# Patient Record
Sex: MALE | Race: WHITE | Employment: FULL TIME | URBAN - METROPOLITAN AREA
[De-identification: names, ages, dates, MRNs, and addresses within clinical notes are randomized per-mention and may not be internally consistent; named-entity substitution may affect disease eponyms.]

---

## 2018-08-22 ENCOUNTER — APPOINTMENT (OUTPATIENT)
Dept: GENERAL RADIOLOGY | Age: 38
End: 2018-08-22
Payer: COMMERCIAL

## 2018-08-22 ENCOUNTER — HOSPITAL ENCOUNTER (EMERGENCY)
Age: 38
Discharge: HOME | End: 2018-08-22
Attending: EMERGENCY MEDICINE
Payer: COMMERCIAL

## 2018-08-22 ENCOUNTER — OFFICE VISIT (OUTPATIENT)
Dept: ORTHOPEDIC SURGERY | Age: 38
End: 2018-08-22

## 2018-08-22 VITALS — BODY MASS INDEX: 25.74 KG/M2 | WEIGHT: 190.04 LBS | HEIGHT: 72 IN

## 2018-08-22 VITALS
RESPIRATION RATE: 20 BRPM | WEIGHT: 190 LBS | HEART RATE: 72 BPM | TEMPERATURE: 98 F | HEIGHT: 72 IN | DIASTOLIC BLOOD PRESSURE: 108 MMHG | SYSTOLIC BLOOD PRESSURE: 174 MMHG | OXYGEN SATURATION: 99 % | BODY MASS INDEX: 25.73 KG/M2

## 2018-08-22 DIAGNOSIS — S61.411A LACERATION OF RIGHT HAND WITHOUT FOREIGN BODY, INITIAL ENCOUNTER: Primary | ICD-10-CM

## 2018-08-22 DIAGNOSIS — S61.111A LACERATION OF RIGHT THUMB WITH DAMAGE TO NAIL, FOREIGN BODY PRESENCE UNSPECIFIED, INITIAL ENCOUNTER: Primary | ICD-10-CM

## 2018-08-22 PROCEDURE — 90715 TDAP VACCINE 7 YRS/> IM: CPT | Performed by: EMERGENCY MEDICINE

## 2018-08-22 PROCEDURE — 99203 OFFICE O/P NEW LOW 30 MIN: CPT | Performed by: ORTHOPAEDIC SURGERY

## 2018-08-22 PROCEDURE — 6370000000 HC RX 637 (ALT 250 FOR IP): Performed by: EMERGENCY MEDICINE

## 2018-08-22 PROCEDURE — 73140 X-RAY EXAM OF FINGER(S): CPT

## 2018-08-22 PROCEDURE — 99283 EMERGENCY DEPT VISIT LOW MDM: CPT

## 2018-08-22 PROCEDURE — 4500000023 HC ED LEVEL 3 PROCEDURE

## 2018-08-22 PROCEDURE — 90471 IMMUNIZATION ADMIN: CPT | Performed by: EMERGENCY MEDICINE

## 2018-08-22 PROCEDURE — 6360000002 HC RX W HCPCS: Performed by: EMERGENCY MEDICINE

## 2018-08-22 RX ORDER — OXYCODONE HYDROCHLORIDE AND ACETAMINOPHEN 5; 325 MG/1; MG/1
1 TABLET ORAL EVERY 6 HOURS PRN
Qty: 28 TABLET | Refills: 0 | Status: SHIPPED | OUTPATIENT
Start: 2018-08-22 | End: 2018-08-29

## 2018-08-22 RX ORDER — SULFAMETHOXAZOLE AND TRIMETHOPRIM 800; 160 MG/1; MG/1
1 TABLET ORAL 2 TIMES DAILY
Qty: 20 TABLET | Refills: 0 | Status: SHIPPED | OUTPATIENT
Start: 2018-08-22 | End: 2018-09-01

## 2018-08-22 RX ORDER — OXYCODONE HYDROCHLORIDE AND ACETAMINOPHEN 5; 325 MG/1; MG/1
1 TABLET ORAL ONCE
Status: COMPLETED | OUTPATIENT
Start: 2018-08-22 | End: 2018-08-22

## 2018-08-22 RX ADMIN — TETANUS TOXOID, REDUCED DIPHTHERIA TOXOID AND ACELLULAR PERTUSSIS VACCINE, ADSORBED 0.5 ML: 5; 2.5; 8; 8; 2.5 SUSPENSION INTRAMUSCULAR at 10:06

## 2018-08-22 RX ADMIN — OXYCODONE HYDROCHLORIDE AND ACETAMINOPHEN 1 TABLET: 5; 325 TABLET ORAL at 10:06

## 2018-08-22 ASSESSMENT — PAIN DESCRIPTION - ORIENTATION: ORIENTATION: RIGHT

## 2018-08-22 ASSESSMENT — ENCOUNTER SYMPTOMS
SHORTNESS OF BREATH: 0
TROUBLE SWALLOWING: 0
VOICE CHANGE: 0
WHEEZING: 0

## 2018-08-22 ASSESSMENT — PAIN DESCRIPTION - PAIN TYPE: TYPE: ACUTE PAIN

## 2018-08-22 ASSESSMENT — PAIN DESCRIPTION - PROGRESSION: CLINICAL_PROGRESSION: NOT CHANGED

## 2018-08-22 ASSESSMENT — PAIN SCALES - GENERAL: PAINLEVEL_OUTOF10: 5

## 2018-08-22 ASSESSMENT — PAIN DESCRIPTION - ONSET: ONSET: SUDDEN

## 2018-08-22 ASSESSMENT — PAIN DESCRIPTION - FREQUENCY: FREQUENCY: CONTINUOUS

## 2018-08-22 ASSESSMENT — PAIN DESCRIPTION - LOCATION: LOCATION: FINGER (COMMENT WHICH ONE)

## 2018-08-22 ASSESSMENT — PAIN DESCRIPTION - DESCRIPTORS: DESCRIPTORS: TINGLING

## 2018-08-22 NOTE — ED PROVIDER NOTES
[facility]  eMERGENCY dEPARTMENT eNCOUnter      Pt Name: Vineet Pacheco  MRN: [id number]  Didiergffrieda 1980  Date of evaluation: 8/22/2018  Provider: Rios Aaron MD    [address] [place] [address]       Chief Complaint   Patient presents with    Hand Injury     Laceration base of right thumb, difficulty moving thumb. Happened about 90 minutes ago while working with sheet metal.         HISTORY OF PRESENT ILLNESS   (Location/Symptom, Timing/Onset, Context/Setting, Quality, Duration, Modifying Factors, Severity)  Note limiting factors. The history is provided by the patient. Laceration   Location:  Hand  Hand laceration location:  R hand  Length:  2 cm  Depth: Through dermis  Quality: avulsion    Bleeding: controlled    Time since incident:  90 minutes  Injury mechanism: was working with sheet metal and cut his hand on edge. Pain details:     Quality:  Sharp    Severity:  Moderate    Timing:  Constant    Progression:  Worsening  Foreign body present: patient states he does not feel any foreign bodies got in cut. Relieved by:  None tried  Worsened by: Movement  Tetanus status:  Unknown  Associated symptoms: no fever, no numbness, no redness and no swelling    Associated symptoms comment:  Pain with movement of thumb      Nursing Notes were reviewed. REVIEW OF SYSTEMS    (2-9 systems for level 4, 10 or more for level 5)     Review of Systems   Constitutional: Negative for fever. HENT: Negative for drooling, trouble swallowing and voice change. Eyes: Negative for visual disturbance. Respiratory: Negative for shortness of breath and wheezing. Cardiovascular: Negative for chest pain and palpitations. Skin: Positive for wound. Neurological: Negative for seizures and syncope. Psychiatric/Behavioral: Negative for self-injury and suicidal ideas. Except as noted above the remainder of the review of systems was reviewed and negative.        PAST MEDICAL HISTORY     Past Medical History:   Diagnosis Date    Hypertension          SURGICAL HISTORY       Past Surgical History:   Procedure Laterality Date    KNEE ARTHROSCOPY           CURRENT MEDICATIONS       Previous Medications    METOPROLOL (TOPROL-XL) 50 MG XL TABLET    Take 50 mg by mouth 2 times daily. ALLERGIES     Patient has no known allergies. FAMILY HISTORY     History reviewed. No pertinent family history. SOCIAL HISTORY       Social History     Social History    Marital status: Single     Spouse name: N/A    Number of children: N/A    Years of education: N/A     Social History Main Topics    Smoking status: Current Every Day Smoker     Packs/day: 0.50     Types: Cigarettes    Smokeless tobacco: Current User     Types: Chew    Alcohol use No    Drug use: No    Sexual activity: Yes     Partners: Female     Other Topics Concern    None     Social History Narrative    None         PHYSICAL EXAM    (up to 7 for level 4, 8 or more for level 5)     ED Triage Vitals [08/22/18 0943]   BP Temp Temp Source Pulse Resp SpO2 Height Weight   (!) 174/108 98 °F (36.7 °C) Oral 72 20 99 % 6' (1.829 m) 190 lb (86.2 kg)       Physical Exam   Constitutional: He appears well-developed and well-nourished. No distress. HENT:   Head: Normocephalic and atraumatic. Eyes: Conjunctivae and EOM are normal.   Neck: No JVD present. No tracheal deviation present. Cardiovascular: Normal rate and intact distal pulses. 2+ DP and PT pulses equal bilaterally. Capillary refill intact in right thumb. Pulmonary/Chest: Effort normal. No respiratory distress. Musculoskeletal: He exhibits tenderness (2 cm mildly avulsed laceration to volar base of right thumb. No visiable bone or tendon inury on exam under local anesthesia. Direct tenderness over laceration. ). Limited flexion of right thumb at the first MCP joint. Neurological: He is alert.    Sensation intact in radial, median, ulnar nerve distribution of the right upper extremity including the distal right thumb. Flexion strength of right first digit at first MCP joint is limited. Skin: Skin is warm and dry. Nursing note and vitals reviewed. DIAGNOSTIC RESULTS     RADIOLOGY:     Interpretation per the Radiologist below, if available at the time of this note:    XR FINGER RIGHT (MIN 2 VIEWS)   Final Result   Soft tissue injury without underlying osseous abnormality             EMERGENCY DEPARTMENT COURSE and DIFFERENTIAL DIAGNOSIS/MDM:   Vitals:    Vitals:    08/22/18 0943   BP: (!) 174/108   Pulse: 72   Resp: 20   Temp: 98 °F (36.7 °C)   TempSrc: Oral   SpO2: 99%   Weight: 190 lb (86.2 kg)   Height: 6' (1.829 m)         MDM  Plain films negative for foreign body or acute bony injury. The patient is vascularly intact however I do have some concerns for tendon injury given his weakened strength on flexion of the thumb even after pain medication given. The patient reports he has a severe phobia of needles and therefore certain parts of his visit including tetanus vaccination, application of local anesthesia, and laceration repair with simple interrupted sutures are somewhat difficult to perform and multiple small breaks are taken to accommodate patient however actually all reasonable efforts are made to treat patient's pain, console and encourage patient, and perform the best exam and suture repair possible under the conditions. The wound is repaired as in procedure note below. Patient still has weakness with flexion of his thumb and therefore I still have a concern for possible tendon injury. He is placed in a splint for protection and I have placed a consult to orthopedic surgery regarding my concern for tendon injury. Dr. Alessandro Moore is available to see the patient at 1 PM today which I feel is appropriate follow-up. The importance of specialty outpatient follow-up is stressed the patient states understanding and agreement with this plan.     CONSULTS:  Amy Quintero

## 2018-08-22 NOTE — PROGRESS NOTES
Chief Complaint  Hand Injury ([facility] Right hand inj thumb laceration DOI 8/22/2018)      History of Present Illness:  Jaswinder Smith is a 34 y.o. male right-hand-dominant male, overall healthy and active, Tania Stai, who presents for evaluation of right thumb pain and weakness which began earlier today. Patient was accidentally struck by a sharp piece of sheet metal on the dorsal radial aspect of the right thumb, he had immediate pain, bleeding and weakness. He was seen in the local emergency room today and found to have inability to extend the right thumb. Wound was irrigated and closed. Patient's pain is moderate and throbbing. He feels weakness. Some numbness dorsally but not volarly of the right thumb    Medical History  Past Medical History:   Diagnosis Date    Hypertension      Past Surgical History:   Procedure Laterality Date    KNEE ARTHROSCOPY       No family history on file. Social History     Social History    Marital status: Single     Spouse name: N/A    Number of children: N/A    Years of education: N/A     Social History Main Topics    Smoking status: Current Every Day Smoker     Packs/day: 0.50     Types: Cigarettes    Smokeless tobacco: Current User     Types: Chew    Alcohol use No    Drug use: No    Sexual activity: Yes     Partners: Female     Other Topics Concern    None     Social History Narrative    None     Current Outpatient Prescriptions   Medication Sig Dispense Refill    sulfamethoxazole-trimethoprim (BACTRIM DS) 800-160 MG per tablet Take 1 tablet by mouth 2 times daily for 10 days 20 tablet 0    oxyCODONE-acetaminophen (PERCOCET) 5-325 MG per tablet Take 1 tablet by mouth every 6 hours as needed for Pain for up to 7 days. . 28 tablet 0    metoprolol (TOPROL-XL) 50 MG XL tablet Take 50 mg by mouth 2 times daily. No current facility-administered medications for this visit.       No Known Allergies    Review of Systems  Relevant review of systems reviewed and Exam: right thumb lac   Acuity: Acute   Type of Exam: Initial   Mechanism of Injury: lac from sheet metal       FINDINGS:   There is soft tissue irregularity adjacent to the 1st MCP joint consistent   with a laceration.  There is no evidence of radiopaque foreign body.  The   osseous structures are intact.           Impression   Soft tissue injury without underlying osseous abnormality                Assessment:  Right thumb laceration, extensor tendon injury    Impression:   Encounter Diagnosis   Name Primary?  Laceration of right thumb with damage to nail, foreign body presence unspecified, initial encounter Yes       Office Procedures:  No orders of the defined types were placed in this encounter. Treatment Plan:  Recommendation made to proceed to the operating room for a formal irrigation and exploration of the right thumb with tendon repair. Antibiotics have been ordered. Wound care was discussed. He has a brace. Good elevation with no gripping or lifting activities. Patient desires surgery Monday, rather than tomorrow. Surgical procedure along with time to recovery was outlined. Pain medication was provided also. He understands to take sparingly. Likely off of work 6-8 weeks, in terms of full use right thumb. The risks and benefits were discussed thoroughly. These included, but were not limited to infection, tendon or nerve injury, scar or thumb sensitivity, need for transfusion, adverse effects of anesthesia, incomplete relief of numbness, tendon rupture, need for scar release in the future, incomplete relief of pain, and/or weakness. All questions and concerns were addressed today. Patient is in agreement with the plan. Manda Nathan MD  Hand & Upper Extremity Surgery  [address] [place]  A partner of [facility] ([facility])        Please note that this transcription was created using voice recognition software.   Any errors are unintentional and may be

## 2018-08-27 ENCOUNTER — HOSPITAL ENCOUNTER (OUTPATIENT)
Age: 38
Setting detail: OUTPATIENT SURGERY
Discharge: HOME | End: 2018-08-27
Attending: ORTHOPAEDIC SURGERY | Admitting: ORTHOPAEDIC SURGERY
Payer: COMMERCIAL

## 2018-08-27 ENCOUNTER — ANESTHESIA EVENT (OUTPATIENT)
Dept: OPERATING ROOM | Age: 38
End: 2018-08-27
Payer: COMMERCIAL

## 2018-08-27 ENCOUNTER — ANESTHESIA (OUTPATIENT)
Dept: OPERATING ROOM | Age: 38
End: 2018-08-27
Payer: COMMERCIAL

## 2018-08-27 VITALS
HEIGHT: 72 IN | WEIGHT: 190 LBS | SYSTOLIC BLOOD PRESSURE: 148 MMHG | HEART RATE: 82 BPM | BODY MASS INDEX: 25.73 KG/M2 | OXYGEN SATURATION: 98 % | RESPIRATION RATE: 18 BRPM | TEMPERATURE: 98 F | DIASTOLIC BLOOD PRESSURE: 81 MMHG

## 2018-08-27 VITALS
RESPIRATION RATE: 12 BRPM | SYSTOLIC BLOOD PRESSURE: 115 MMHG | DIASTOLIC BLOOD PRESSURE: 62 MMHG | OXYGEN SATURATION: 98 %

## 2018-08-27 DIAGNOSIS — S61.111D: Primary | ICD-10-CM

## 2018-08-27 PROCEDURE — 6360000002 HC RX W HCPCS

## 2018-08-27 PROCEDURE — 6360000002 HC RX W HCPCS: Performed by: NURSE ANESTHETIST, CERTIFIED REGISTERED

## 2018-08-27 PROCEDURE — 2500000003 HC RX 250 WO HCPCS: Performed by: NURSE ANESTHETIST, CERTIFIED REGISTERED

## 2018-08-27 PROCEDURE — 3600000002 HC SURGERY LEVEL 2 BASE: Performed by: ORTHOPAEDIC SURGERY

## 2018-08-27 PROCEDURE — 7100000000 HC PACU RECOVERY - FIRST 15 MIN: Performed by: ORTHOPAEDIC SURGERY

## 2018-08-27 PROCEDURE — 6360000002 HC RX W HCPCS: Performed by: ANESTHESIOLOGY

## 2018-08-27 PROCEDURE — 2500000003 HC RX 250 WO HCPCS

## 2018-08-27 PROCEDURE — 7100000010 HC PHASE II RECOVERY - FIRST 15 MIN: Performed by: ORTHOPAEDIC SURGERY

## 2018-08-27 PROCEDURE — 3700000001 HC ADD 15 MINUTES (ANESTHESIA): Performed by: ORTHOPAEDIC SURGERY

## 2018-08-27 PROCEDURE — 3600000012 HC SURGERY LEVEL 2 ADDTL 15MIN: Performed by: ORTHOPAEDIC SURGERY

## 2018-08-27 PROCEDURE — 2709999900 HC NON-CHARGEABLE SUPPLY: Performed by: ORTHOPAEDIC SURGERY

## 2018-08-27 PROCEDURE — 2500000003 HC RX 250 WO HCPCS: Performed by: ORTHOPAEDIC SURGERY

## 2018-08-27 PROCEDURE — 3700000000 HC ANESTHESIA ATTENDED CARE: Performed by: ORTHOPAEDIC SURGERY

## 2018-08-27 PROCEDURE — 7100000001 HC PACU RECOVERY - ADDTL 15 MIN: Performed by: ORTHOPAEDIC SURGERY

## 2018-08-27 PROCEDURE — 2580000003 HC RX 258: Performed by: ANESTHESIOLOGY

## 2018-08-27 RX ORDER — SODIUM CHLORIDE, SODIUM LACTATE, POTASSIUM CHLORIDE, CALCIUM CHLORIDE 600; 310; 30; 20 MG/100ML; MG/100ML; MG/100ML; MG/100ML
INJECTION, SOLUTION INTRAVENOUS CONTINUOUS
Status: DISCONTINUED | OUTPATIENT
Start: 2018-08-27 | End: 2018-08-27 | Stop reason: HOSPADM

## 2018-08-27 RX ORDER — BUPIVACAINE HYDROCHLORIDE 2.5 MG/ML
INJECTION, SOLUTION INFILTRATION; PERINEURAL PRN
Status: DISCONTINUED | OUTPATIENT
Start: 2018-08-27 | End: 2018-08-27 | Stop reason: HOSPADM

## 2018-08-27 RX ORDER — PROMETHAZINE HYDROCHLORIDE 25 MG/ML
6.25 INJECTION, SOLUTION INTRAMUSCULAR; INTRAVENOUS
Status: DISCONTINUED | OUTPATIENT
Start: 2018-08-27 | End: 2018-08-27 | Stop reason: HOSPADM

## 2018-08-27 RX ORDER — MORPHINE SULFATE 10 MG/ML
1 INJECTION, SOLUTION INTRAMUSCULAR; INTRAVENOUS EVERY 5 MIN PRN
Status: DISCONTINUED | OUTPATIENT
Start: 2018-08-27 | End: 2018-08-27 | Stop reason: HOSPADM

## 2018-08-27 RX ORDER — HYDROMORPHONE HCL 110MG/55ML
0.5 PATIENT CONTROLLED ANALGESIA SYRINGE INTRAVENOUS EVERY 5 MIN PRN
Status: COMPLETED | OUTPATIENT
Start: 2018-08-27 | End: 2018-08-27

## 2018-08-27 RX ORDER — MEPERIDINE HYDROCHLORIDE 25 MG/ML
12.5 INJECTION INTRAMUSCULAR; INTRAVENOUS; SUBCUTANEOUS EVERY 5 MIN PRN
Status: DISCONTINUED | OUTPATIENT
Start: 2018-08-27 | End: 2018-08-27 | Stop reason: HOSPADM

## 2018-08-27 RX ORDER — LABETALOL HYDROCHLORIDE 5 MG/ML
5 INJECTION, SOLUTION INTRAVENOUS EVERY 10 MIN PRN
Status: DISCONTINUED | OUTPATIENT
Start: 2018-08-27 | End: 2018-08-27 | Stop reason: HOSPADM

## 2018-08-27 RX ORDER — FENTANYL CITRATE 50 UG/ML
INJECTION, SOLUTION INTRAMUSCULAR; INTRAVENOUS PRN
Status: DISCONTINUED | OUTPATIENT
Start: 2018-08-27 | End: 2018-08-27 | Stop reason: SDUPTHER

## 2018-08-27 RX ORDER — DEXAMETHASONE SODIUM PHOSPHATE 4 MG/ML
INJECTION, SOLUTION INTRA-ARTICULAR; INTRALESIONAL; INTRAMUSCULAR; INTRAVENOUS; SOFT TISSUE PRN
Status: DISCONTINUED | OUTPATIENT
Start: 2018-08-27 | End: 2018-08-27 | Stop reason: SDUPTHER

## 2018-08-27 RX ORDER — DIPHENHYDRAMINE HYDROCHLORIDE 50 MG/ML
12.5 INJECTION INTRAMUSCULAR; INTRAVENOUS
Status: DISCONTINUED | OUTPATIENT
Start: 2018-08-27 | End: 2018-08-27 | Stop reason: HOSPADM

## 2018-08-27 RX ORDER — HYDROMORPHONE HCL 110MG/55ML
0.25 PATIENT CONTROLLED ANALGESIA SYRINGE INTRAVENOUS EVERY 5 MIN PRN
Status: DISCONTINUED | OUTPATIENT
Start: 2018-08-27 | End: 2018-08-27 | Stop reason: HOSPADM

## 2018-08-27 RX ORDER — LIDOCAINE HYDROCHLORIDE 10 MG/ML
INJECTION, SOLUTION EPIDURAL; INFILTRATION; INTRACAUDAL; PERINEURAL
Status: COMPLETED
Start: 2018-08-27 | End: 2018-08-27

## 2018-08-27 RX ORDER — SODIUM CHLORIDE, SODIUM LACTATE, POTASSIUM CHLORIDE, CALCIUM CHLORIDE 600; 310; 30; 20 MG/100ML; MG/100ML; MG/100ML; MG/100ML
INJECTION, SOLUTION INTRAVENOUS
Status: DISCONTINUED
Start: 2018-08-27 | End: 2018-08-27 | Stop reason: HOSPADM

## 2018-08-27 RX ORDER — PROPOFOL 10 MG/ML
INJECTION, EMULSION INTRAVENOUS PRN
Status: DISCONTINUED | OUTPATIENT
Start: 2018-08-27 | End: 2018-08-27 | Stop reason: SDUPTHER

## 2018-08-27 RX ORDER — OXYCODONE HYDROCHLORIDE AND ACETAMINOPHEN 5; 325 MG/1; MG/1
2 TABLET ORAL PRN
Status: DISCONTINUED | OUTPATIENT
Start: 2018-08-27 | End: 2018-08-27 | Stop reason: HOSPADM

## 2018-08-27 RX ORDER — ONDANSETRON 2 MG/ML
INJECTION INTRAMUSCULAR; INTRAVENOUS PRN
Status: DISCONTINUED | OUTPATIENT
Start: 2018-08-27 | End: 2018-08-27 | Stop reason: SDUPTHER

## 2018-08-27 RX ORDER — ONDANSETRON 2 MG/ML
4 INJECTION INTRAMUSCULAR; INTRAVENOUS
Status: DISCONTINUED | OUTPATIENT
Start: 2018-08-27 | End: 2018-08-27 | Stop reason: HOSPADM

## 2018-08-27 RX ORDER — MORPHINE SULFATE 10 MG/ML
2 INJECTION, SOLUTION INTRAMUSCULAR; INTRAVENOUS EVERY 5 MIN PRN
Status: DISCONTINUED | OUTPATIENT
Start: 2018-08-27 | End: 2018-08-27 | Stop reason: HOSPADM

## 2018-08-27 RX ORDER — HYDRALAZINE HYDROCHLORIDE 20 MG/ML
5 INJECTION INTRAMUSCULAR; INTRAVENOUS EVERY 10 MIN PRN
Status: DISCONTINUED | OUTPATIENT
Start: 2018-08-27 | End: 2018-08-27 | Stop reason: HOSPADM

## 2018-08-27 RX ORDER — MIDAZOLAM HYDROCHLORIDE 1 MG/ML
INJECTION INTRAMUSCULAR; INTRAVENOUS PRN
Status: DISCONTINUED | OUTPATIENT
Start: 2018-08-27 | End: 2018-08-27 | Stop reason: SDUPTHER

## 2018-08-27 RX ORDER — OXYCODONE HYDROCHLORIDE AND ACETAMINOPHEN 5; 325 MG/1; MG/1
1 TABLET ORAL EVERY 6 HOURS PRN
Qty: 25 TABLET | Refills: 0 | Status: SHIPPED | OUTPATIENT
Start: 2018-08-27 | End: 2018-09-20 | Stop reason: CLARIF

## 2018-08-27 RX ORDER — OXYCODONE HYDROCHLORIDE AND ACETAMINOPHEN 5; 325 MG/1; MG/1
1 TABLET ORAL PRN
Status: DISCONTINUED | OUTPATIENT
Start: 2018-08-27 | End: 2018-08-27 | Stop reason: HOSPADM

## 2018-08-27 RX ORDER — LIDOCAINE HYDROCHLORIDE 20 MG/ML
INJECTION, SOLUTION INFILTRATION; PERINEURAL PRN
Status: DISCONTINUED | OUTPATIENT
Start: 2018-08-27 | End: 2018-08-27 | Stop reason: SDUPTHER

## 2018-08-27 RX ADMIN — DEXAMETHASONE SODIUM PHOSPHATE 8 MG: 4 INJECTION, SOLUTION INTRAMUSCULAR; INTRAVENOUS at 09:58

## 2018-08-27 RX ADMIN — SODIUM CHLORIDE, POTASSIUM CHLORIDE, SODIUM LACTATE AND CALCIUM CHLORIDE: 600; 310; 30; 20 INJECTION, SOLUTION INTRAVENOUS at 08:53

## 2018-08-27 RX ADMIN — ONDANSETRON 4 MG: 2 INJECTION, SOLUTION INTRAMUSCULAR; INTRAVENOUS at 09:58

## 2018-08-27 RX ADMIN — LIDOCAINE HYDROCHLORIDE 50 MG: 20 INJECTION, SOLUTION INFILTRATION; PERINEURAL at 09:52

## 2018-08-27 RX ADMIN — HYDROMORPHONE HYDROCHLORIDE 0.5 MG: 2 INJECTION INTRAMUSCULAR; INTRAVENOUS; SUBCUTANEOUS at 10:51

## 2018-08-27 RX ADMIN — HYDROMORPHONE HYDROCHLORIDE 0.5 MG: 2 INJECTION INTRAMUSCULAR; INTRAVENOUS; SUBCUTANEOUS at 10:36

## 2018-08-27 RX ADMIN — MIDAZOLAM 2 MG: 1 INJECTION INTRAMUSCULAR; INTRAVENOUS at 09:50

## 2018-08-27 RX ADMIN — PROPOFOL 200 MG: 10 INJECTION, EMULSION INTRAVENOUS at 09:52

## 2018-08-27 RX ADMIN — LIDOCAINE HYDROCHLORIDE 2 ML: 10 INJECTION, SOLUTION EPIDURAL; INFILTRATION; INTRACAUDAL; PERINEURAL at 08:54

## 2018-08-27 RX ADMIN — FENTANYL CITRATE 100 MCG: 50 INJECTION INTRAMUSCULAR; INTRAVENOUS at 09:50

## 2018-08-27 RX ADMIN — HYDROMORPHONE HYDROCHLORIDE 0.5 MG: 2 INJECTION INTRAMUSCULAR; INTRAVENOUS; SUBCUTANEOUS at 10:41

## 2018-08-27 RX ADMIN — Medication 2 G: at 09:44

## 2018-08-27 RX ADMIN — HYDROMORPHONE HYDROCHLORIDE 0.5 MG: 2 INJECTION INTRAMUSCULAR; INTRAVENOUS; SUBCUTANEOUS at 10:46

## 2018-08-27 ASSESSMENT — PAIN DESCRIPTION - DESCRIPTORS
DESCRIPTORS: ACHING

## 2018-08-27 ASSESSMENT — PAIN DESCRIPTION - LOCATION
LOCATION: HAND

## 2018-08-27 ASSESSMENT — PULMONARY FUNCTION TESTS
PIF_VALUE: 2
PIF_VALUE: 16
PIF_VALUE: 3
PIF_VALUE: 0
PIF_VALUE: 16
PIF_VALUE: 16
PIF_VALUE: 5
PIF_VALUE: 16
PIF_VALUE: 10
PIF_VALUE: 4
PIF_VALUE: 16
PIF_VALUE: 0
PIF_VALUE: 1
PIF_VALUE: 6
PIF_VALUE: 18
PIF_VALUE: 16
PIF_VALUE: 15
PIF_VALUE: 3
PIF_VALUE: 0
PIF_VALUE: 0
PIF_VALUE: 4
PIF_VALUE: 16
PIF_VALUE: 16
PIF_VALUE: 10
PIF_VALUE: 16
PIF_VALUE: 0
PIF_VALUE: 0
PIF_VALUE: 6
PIF_VALUE: 18
PIF_VALUE: 16
PIF_VALUE: 6

## 2018-08-27 ASSESSMENT — PAIN DESCRIPTION - ORIENTATION
ORIENTATION: RIGHT

## 2018-08-27 ASSESSMENT — PAIN SCALES - GENERAL
PAINLEVEL_OUTOF10: 5
PAINLEVEL_OUTOF10: 7
PAINLEVEL_OUTOF10: 6
PAINLEVEL_OUTOF10: 9

## 2018-08-27 ASSESSMENT — PAIN DESCRIPTION - PAIN TYPE
TYPE: SURGICAL PAIN

## 2018-08-27 ASSESSMENT — PAIN - FUNCTIONAL ASSESSMENT: PAIN_FUNCTIONAL_ASSESSMENT: 0-10

## 2018-08-27 NOTE — ANESTHESIA POSTPROCEDURE EVALUATION
Department of Anesthesiology  Postprocedure Note    Patient: Gita Tadeo  MRN: [id number]  YOB: 1980  Date of evaluation: 8/27/2018  Time:  11:44 AM     Procedure Summary     Date:  08/27/18 Room / Location:  [facility] EG OR 01 / [facility] OR    Anesthesia Start:  8252 Anesthesia Stop:  3526    Procedure:  EXPLORATION AND IRRIGATION RIGHT THUMB WITH TENDON REPAIR (Right Hand) Diagnosis:  (LACERATION OF RIGHT THUMB WITH DAMAGE TO NAIL, FOREIGN BODY PRESENCE)    Surgeon:  Ted Turpin MD Responsible Provider:  Cal Bashir MD    Anesthesia Type:  general ASA Status:  2          Anesthesia Type: general    Susan Phase I: Susan Score: 10    Susan Phase II: Susan Score: 10    Last vitals: Reviewed and per EMR flowsheets.        Anesthesia Post Evaluation    Patient location during evaluation: PACU  Patient participation: complete - patient participated  Level of consciousness: awake and alert  Airway patency: patent  Nausea & Vomiting: no nausea and no vomiting  Complications: no  Cardiovascular status: blood pressure returned to baseline  Respiratory status: acceptable  Hydration status: euvolemic

## 2018-08-27 NOTE — ANESTHESIA PRE PROCEDURE
Department of Anesthesiology  Preprocedure Note       Name:  Susanne Dailey   Age:  34 y.o.  :  1980                                          MRN:  [id number]         Date:  2018      Surgeon: Darlene Amanda):  Ankit Rodriguez MD    Procedure: Procedure(s):  EXPLORATION AND IRRIGATION RIGHT THUMB WITH TENDON REPAIR    Medications prior to admission:   Prior to Admission medications    Medication Sig Start Date End Date Taking? Authorizing Provider   oxyCODONE-acetaminophen (PERCOCET) 5-325 MG per tablet Take 1 tablet by mouth every 6 hours as needed for Pain for up to 7 days. . 8/27/18 9/3/18 Yes Ankit Rodriguez MD   sulfamethoxazole-trimethoprim (BACTRIM DS) 800-160 MG per tablet Take 1 tablet by mouth 2 times daily for 10 days 18 Yes Ankit Rodriguez MD   oxyCODONE-acetaminophen (PERCOCET) 5-325 MG per tablet Take 1 tablet by mouth every 6 hours as needed for Pain for up to 7 days. . 18 Yes Ankit Rodriguez MD   metoprolol (TOPROL-XL) 50 MG XL tablet Take 50 mg by mouth 2 times daily.      Yes Historical Provider, MD       Current medications:    Current Facility-Administered Medications   Medication Dose Route Frequency Provider Last Rate Last Dose    ceFAZolin (ANCEF) 2 g in dextrose 5 % 100 mL IVPB  2 g Intravenous Once Ankit Rodriguez  mL/hr at 18 0944 2 g at 18 0944    lactated ringers infusion   Intravenous Continuous Romana Crow, MD 50 mL/hr at 18 0853      lidocaine 1 % (PF) injection 0.1 mL  0.1 mL Intradermal Once PRN Romana Crow, MD        lactated ringers infusion             HYDROmorphone (DILAUDID) injection 0.25 mg  0.25 mg Intravenous Q5 Min PRN Romana Crow, MD        HYDROmorphone (DILAUDID) injection 0.5 mg  0.5 mg Intravenous Q5 Min PRN Romana Crow, MD        morphine (PF) injection 1 mg  1 mg Intravenous Q5 Min PRN Dong Gillis Betina Smith MD        morphine (PF) injection 2 mg  2 mg Intravenous Q5 Min PRN Meeta Rhoades MD        oxyCODONE-acetaminophen (PERCOCET) 5-325 MG per tablet 1 tablet  1 tablet Oral PRN Meeta Rhoades MD        Or    oxyCODONE-acetaminophen (PERCOCET) 5-325 MG per tablet 2 tablet  2 tablet Oral PRN Meeta Rhoades MD        diphenhydrAMINE (BENADRYL) injection 12.5 mg  12.5 mg Intravenous Once PRN Meeta Rhoades MD        ondansetron [facility]) injection 4 mg  4 mg Intravenous Once PRN Meeta Rhoades MD        promethazine [facility]) injection 6.25 mg  6.25 mg Intravenous Once PRN Meeta Rhoades MD        labetalol (NORMODYNE;TRANDATE) injection 5 mg  5 mg Intravenous Q10 Min PRN Meeta Rhoades MD        hydrALAZINE (APRESOLINE) injection 5 mg  5 mg Intravenous Q10 Min PRN Meeta Rhoades MD        meperidine (DEMEROL) injection 12.5 mg  12.5 mg Intravenous Q5 Min PRN Meeta Rhoades MD           Allergies:  No Known Allergies    Problem List:    Patient Active Problem List   Diagnosis Code    Laceration of right thumb with damage to nail I99.258E       Past Medical History:        Diagnosis Date    Hypertension        Past Surgical History:        Procedure Laterality Date    KNEE ARTHROSCOPY         Social History:    Social History   Substance Use Topics    Smoking status: Current Every Day Smoker     Packs/day: 0.50     Types: Cigarettes    Smokeless tobacco: Current User     Types: Chew    Alcohol use No                                Ready to quit: Not Answered  Counseling given: Not Answered      Vital Signs (Current):   Vitals:    08/27/18 0840   BP: (!) 131/92   Pulse: 84   Resp: 16   Temp: 97 °F (36.1 °C)   TempSrc: Temporal   SpO2: 99%   Weight: 190 lb (86.2 kg)   Height: 6' (1.829 m)                                              BP Readings from Last 3 Encounters:   08/27/18 (!) 131/92 08/27/18 126/79   08/22/18 (!) 174/108       NPO Status: Time of last liquid consumption: 0000                        Time of last solid consumption: 0000                        Date of last liquid consumption: 08/26/18                        Date of last solid food consumption: 08/26/18    BMI:   Wt Readings from Last 3 Encounters:   08/27/18 190 lb (86.2 kg)   08/22/18 190 lb 0.6 oz (86.2 kg)   08/22/18 190 lb (86.2 kg)     Body mass index is 25.77 kg/m². CBC: No results found for: WBC, RBC, HGB, HCT, MCV, RDW, PLT    CMP: No results found for: NA, K, CL, CO2, BUN, CREATININE, GFRAA, AGRATIO, LABGLOM, GLUCOSE, PROT, CALCIUM, BILITOT, ALKPHOS, AST, ALT    POC Tests: No results for input(s): POCGLU, POCNA, POCK, POCCL, POCBUN, POCHEMO, POCHCT in the last 72 hours. Coags: No results found for: PROTIME, INR, APTT    HCG (If Applicable): No results found for: PREGTESTUR, PREGSERUM, HCG, HCGQUANT     ABGs: No results found for: PHART, PO2ART, POJ9UFK, KHZ0GKK, BEART, [id number]     Type & Screen (If Applicable):  No results found for: LABABO, LABRH    Anesthesia Evaluation   no history of anesthetic complications:   Airway: Mallampati: I  TM distance: >3 FB   Neck ROM: full  Mouth opening: > = 3 FB Dental: normal exam         Pulmonary:                              Cardiovascular:    (+) hypertension:,                   Neuro/Psych:   Negative Neuro/Psych ROS              GI/Hepatic/Renal: Neg GI/Hepatic/Renal ROS            Endo/Other: Negative Endo/Other ROS                    Abdominal:           Vascular: negative vascular ROS. Pre-Operative Diagnosis: LACERATION OF RIGHT THUMB WITH DAMAGE TO NAIL, FOREIGN BODY PRESENCE    34 y.o.   BMI:  Body mass index is 25.77 kg/m².      Vitals:    08/27/18 0840   BP: (!) 131/92   Pulse: 84   Resp: 16   Temp: 97 °F (36.1 °C)   TempSrc: Temporal   SpO2: 99%   Weight: 190 lb (86.2 kg)   Height: 6' (1.829 m)       No Known Allergies    Social

## 2018-08-27 NOTE — H&P
I have reviewed the History & Physical and examined the patient and find no relevant changes. I have reviewed with the patient and/or family the risks, benefits, and alternatives to the procedure(s). All questions and concerns were addressed. Consent is on the chart. Surgical site, right thumb, has been marked by Dr Venecia Cruz and confirmed by the patient. Extension loss still present. All questions and concerns were addressed today. Patient is in agreement with the plan.         Deion Wong MD  Hand & Upper Extremity Surgery  [address] [facility] partner of [facility] ([facility])

## 2018-08-28 NOTE — OP NOTE
exsanguination, the tourniquet was inflated to 250 mmHg. The patient had an oblique laceration over the dorsum of the right thumb  near the level just proximal to the MP crease. This was full-thickness. Temporizing sutures were carefully removed and discarded. The laceration  was extended distally and proximally to gain full access to the area of  injury. There was debridement of the skin edges and the subcutaneous  tissue. There was no foreign body or sign of early infection. There was  complete transection with retraction of the APL tendons as they insert at  the level and just distal to the MP joint. The wounds were copiously  irrigated with sterile saline. Under direct visualization, the extensor  tendon to the thumb, APL was reapproximated utilizing 4-0 Ethibond suture. Multiple locking sutures were placed. There was nice realignment of the  tendons, no gapping. The wound was irrigated once again and then skin was  closed with interrupted nylon. No other abnormalities were noted. The  thumb was well-aligned clinically at rest.  The patient was under  anesthesia. Local anesthetic was given before awaking. Sterile dressings  were placed followed by a thumb spica splint. The patient was awoken from  anesthesia, tolerated the case well and was taken to postanesthesia  recovery unit in good condition. No complications. POSTOPERATIVE COURSE:  The patient will follow up in the next 5-7 days. Wound inspection. Sutures to be removed likely 10-14 days. Thumb spica  casting will be initiated for the first 4 weeks to allow the tendons to  heal.  We will then transition to a brace and gently and slowly initiate  motion. No heavy gripping or impact activities for 8 weeks.         Lam KEENE MD    D: 08/28/2018 8:17:23       T: 08/28/2018 8:18:38     [id number]  Job#: [id number]     Doc#: [id number]    CC:

## 2018-09-07 ENCOUNTER — OFFICE VISIT (OUTPATIENT)
Dept: ORTHOPEDIC SURGERY | Age: 38
End: 2018-09-07

## 2018-09-07 VITALS — BODY MASS INDEX: 25.73 KG/M2 | HEIGHT: 72 IN | WEIGHT: 190 LBS

## 2018-09-07 DIAGNOSIS — S61.111D: ICD-10-CM

## 2018-09-07 DIAGNOSIS — M79.644 PAIN OF RIGHT THUMB: Primary | ICD-10-CM

## 2018-09-07 PROCEDURE — 99024 POSTOP FOLLOW-UP VISIT: CPT | Performed by: ORTHOPAEDIC SURGERY

## 2018-09-07 PROCEDURE — 29075 APPL CST ELBW FNGR SHORT ARM: CPT | Performed by: ORTHOPAEDIC SURGERY

## 2018-09-07 RX ORDER — OXYCODONE HYDROCHLORIDE AND ACETAMINOPHEN 5; 325 MG/1; MG/1
1 TABLET ORAL EVERY 8 HOURS PRN
Qty: 21 TABLET | Refills: 0 | Status: SHIPPED | OUTPATIENT
Start: 2018-09-10 | End: 2018-09-20 | Stop reason: CLARIF

## 2018-09-07 NOTE — PROGRESS NOTES
the cast was explained today with the patient. The cast is to be left clean, dry, and intact (not to be removed). Appropriate activity restrictions were outlined, which will help prevent further injury and/or fracture displacement. Information was provided to contact my office if having new pain, swelling of the extremity, numbness or discoloration of the fingers/toes, or other changes/problems after cast application. No lifting. No use of the thumb and hand right now. He is unable to work until the tendon is healed. Follow-up in 3 weeks unless needed sooner. Cast off and transition to a brace. We will initiate therapy at that time. All questions and concerns were addressed today. Patient is in agreement with the plan. Sara Li MD  Hand & Upper Extremity Surgery  [address]  A partner of [facility] ([facility])        Please note that this transcription was created using voice recognition software. Any errors are unintentional and may be due to voice recognition transcription.

## 2018-09-21 DIAGNOSIS — S61.111D: ICD-10-CM

## 2018-09-28 ENCOUNTER — OFFICE VISIT (OUTPATIENT)
Dept: ORTHOPEDIC SURGERY | Age: 38
End: 2018-09-28
Payer: COMMERCIAL

## 2018-09-28 ENCOUNTER — TELEPHONE (OUTPATIENT)
Dept: ORTHOPEDIC SURGERY | Age: 38
End: 2018-09-28

## 2018-09-28 VITALS — HEIGHT: 72 IN | BODY MASS INDEX: 26.41 KG/M2 | WEIGHT: 195 LBS

## 2018-09-28 DIAGNOSIS — S61.011D LACERATION OF RIGHT THUMB WITH TENDON INVOLVEMENT, SUBSEQUENT ENCOUNTER: Primary | ICD-10-CM

## 2018-09-28 PROCEDURE — L3984 UPPER EXT FX ORTHOSIS WRIST: HCPCS | Performed by: ORTHOPAEDIC SURGERY

## 2018-09-28 PROCEDURE — 99024 POSTOP FOLLOW-UP VISIT: CPT | Performed by: ORTHOPAEDIC SURGERY

## 2018-09-28 NOTE — TELEPHONE ENCOUNTER
9/28/18  DME  - NO PRECERT REQUIRED FOR IN NETWORK PROVIDERS AND LESS THAN $750 - PER [facility] GUIDELINES/NOTES - NDS

## 2018-09-28 NOTE — PROGRESS NOTES
Chief Complaint   Patient presents with    Follow-up     8/27/18 R THUMB EXTENSOR TENDON REPAIR,        HISTORY OF PRESENT ILLNESS:  Herber Wood is a 34 y.o.  patient here for repeat evaluation after sustaining a Work related laceration to the dorsal radial aspect of the right thumb, now 1 month following open repair of the extensor tendon. He was casted postsurgically for concern of compliance in the care plan to allow tendon healing before rupture. He presents today not wearing his cast, he states that he cut it off himself, against my medical advice and plan. Patient has mild pain and stiffness. ROS:  ROS neg     Past medical history is reviewed again today, no changes to report    PHYSICAL EXAMINATION:  Patient is alert and pleasant, in no acute distress. The affected extremity is examined today. Right thumb reveals a well-healed laceration and surgical site with mild swelling. Certainly no sign of infection. No breakdown or sign of infection from the cast.  Flexion and extension of the thumb reveals intact tendon function.  strength not tested with recent tendon surgery. Thumb is sensate and perfused    IMPRESSION AND PLAN: Right thumb laceration with extensor tendon transection    Doing well. We will continue with our current care plan. Transition to a thumb spica brace which can be off for hygiene and some gentle motion at home and soft tissue massage. We have ordered postoperative hand therapy which she declines. There is no lifting more than 5 pounds for 4 more weeks. He is off work for 4 more weeks. He has to be in the brace full-time except what is noted above over the next 2 weeks. I will repeat evaluation in 4 weeks, unless needed sooner. We'll consider return to work at that point. Procedures    [employer] Long Thumb Spica Fracture Brace     Patient was prescribed a [employer] Long Thumb Spica Brace.   The right wrist and thumb will require stabilization / immobilization from this semi-rigid / rigid orthosis to improve their function. The orthosis will assist in protecting the affected area, provide functional support and facilitate healing. The orthosis used a heating element to mold and shape the brace to provide a customizable fit for the patient. The patient was educated and fit by a healthcare professional with expert knowledge and specialization in brace application while under the direct supervision of the treating physician. Verbal and written instructions for the use of and application of this item were provided. They were instructed to contact the office immediately should the brace result in increased pain, decreased sensation, increased swelling or worsening of the condition     All questions and concerns were addressed today. Patient is in agreement with the plan. Hany Jackson MD  Hand & Upper Extremity Surgery  [address] at the [facility]  A partner of [facility] ([facility])        Please note that this transcription was created using voice recognition software. Any errors are unintentional and may be due to voice recognition transcription.

## 2018-10-24 ENCOUNTER — OFFICE VISIT (OUTPATIENT)
Dept: ORTHOPEDIC SURGERY | Age: 38
End: 2018-10-24

## 2018-10-24 VITALS — BODY MASS INDEX: 26.43 KG/M2 | WEIGHT: 195.11 LBS | HEIGHT: 72 IN

## 2018-10-24 DIAGNOSIS — S61.011D LACERATION OF RIGHT THUMB WITH TENDON INVOLVEMENT, SUBSEQUENT ENCOUNTER: Primary | ICD-10-CM

## 2018-10-24 PROCEDURE — 99024 POSTOP FOLLOW-UP VISIT: CPT | Performed by: ORTHOPAEDIC SURGERY

## 2018-11-27 ENCOUNTER — OFFICE VISIT (OUTPATIENT)
Dept: ORTHOPEDIC SURGERY | Age: 38
End: 2018-11-27

## 2018-11-27 VITALS — WEIGHT: 195.11 LBS | HEIGHT: 72 IN | BODY MASS INDEX: 26.43 KG/M2

## 2018-11-27 DIAGNOSIS — R20.0 NUMBNESS OF RIGHT HAND: ICD-10-CM

## 2018-11-27 DIAGNOSIS — S61.011D LACERATION OF RIGHT THUMB WITH TENDON INVOLVEMENT, SUBSEQUENT ENCOUNTER: Primary | ICD-10-CM

## 2018-11-27 PROCEDURE — 99024 POSTOP FOLLOW-UP VISIT: CPT | Performed by: ORTHOPAEDIC SURGERY

## 2018-11-27 RX ORDER — METHYLPREDNISOLONE 4 MG/1
TABLET ORAL
Qty: 1 KIT | Refills: 0 | Status: SHIPPED | OUTPATIENT
Start: 2018-11-27 | End: 2018-11-27 | Stop reason: CLARIF

## 2018-11-27 RX ORDER — METHYLPREDNISOLONE 4 MG/1
TABLET ORAL
Qty: 21 KIT | Refills: 0 | Status: SHIPPED | OUTPATIENT
Start: 2018-11-27

## 2018-11-27 NOTE — PROGRESS NOTES
Tamiko Up MD  Hand & Upper Extremity Surgery  [address] [facility] partner of [facility] ([facility])        Please note that this transcription was created using voice recognition software. Any errors are unintentional and may be due to voice recognition transcription.

## 2018-12-03 ENCOUNTER — TELEPHONE (OUTPATIENT)
Dept: ORTHOPEDIC SURGERY | Age: 38
End: 2018-12-03

## 2018-12-04 ENCOUNTER — TELEPHONE (OUTPATIENT)
Dept: ORTHOPEDIC SURGERY | Age: 38
End: 2018-12-04

## 2019-01-15 ENCOUNTER — OFFICE VISIT (OUTPATIENT)
Dept: ORTHOPEDIC SURGERY | Age: 39
End: 2019-01-15
Payer: COMMERCIAL

## 2019-01-15 DIAGNOSIS — G56.01 CARPAL TUNNEL SYNDROME OF RIGHT WRIST: Primary | ICD-10-CM

## 2019-01-15 PROCEDURE — 95886 MUSC TEST DONE W/N TEST COMP: CPT | Performed by: PHYSICAL MEDICINE & REHABILITATION

## 2019-01-15 PROCEDURE — 95908 NRV CNDJ TST 3-4 STUDIES: CPT | Performed by: PHYSICAL MEDICINE & REHABILITATION

## 2019-01-23 ENCOUNTER — OFFICE VISIT (OUTPATIENT)
Dept: ORTHOPEDIC SURGERY | Age: 39
End: 2019-01-23
Payer: COMMERCIAL

## 2019-01-23 VITALS
HEART RATE: 79 BPM | WEIGHT: 195.11 LBS | BODY MASS INDEX: 26.43 KG/M2 | DIASTOLIC BLOOD PRESSURE: 87 MMHG | SYSTOLIC BLOOD PRESSURE: 143 MMHG | HEIGHT: 72 IN

## 2019-01-23 DIAGNOSIS — G56.01 CARPAL TUNNEL SYNDROME OF RIGHT WRIST: Primary | ICD-10-CM

## 2019-01-23 DIAGNOSIS — R20.0 NUMBNESS OF RIGHT HAND: ICD-10-CM

## 2019-01-23 PROCEDURE — G8419 CALC BMI OUT NRM PARAM NOF/U: HCPCS | Performed by: ORTHOPAEDIC SURGERY

## 2019-01-23 PROCEDURE — G8428 CUR MEDS NOT DOCUMENT: HCPCS | Performed by: ORTHOPAEDIC SURGERY

## 2019-01-23 PROCEDURE — 99213 OFFICE O/P EST LOW 20 MIN: CPT | Performed by: ORTHOPAEDIC SURGERY

## 2019-01-23 PROCEDURE — 4004F PT TOBACCO SCREEN RCVD TLK: CPT | Performed by: ORTHOPAEDIC SURGERY

## 2019-01-23 PROCEDURE — G8484 FLU IMMUNIZE NO ADMIN: HCPCS | Performed by: ORTHOPAEDIC SURGERY

## 2019-02-01 ENCOUNTER — TELEPHONE (OUTPATIENT)
Dept: ORTHOPEDIC SURGERY | Age: 39
End: 2019-02-01

## 2019-04-01 ENCOUNTER — TELEPHONE (OUTPATIENT)
Dept: ORTHOPEDIC SURGERY | Age: 39
End: 2019-04-01

## 2019-04-09 ENCOUNTER — TELEPHONE (OUTPATIENT)
Dept: ORTHOPEDIC SURGERY | Age: 39
End: 2019-04-09

## 2019-04-09 NOTE — TELEPHONE ENCOUNTER
Auth: NPR  Date: 4/22/19  Reference # None  Spoke with: None  Type of SX: Outpatient  Location: [place] [address]   SX area: [facility]

## 2019-04-18 ENCOUNTER — TELEPHONE (OUTPATIENT)
Dept: ORTHOPEDIC SURGERY | Age: 39
End: 2019-04-18

## 2019-04-18 NOTE — TELEPHONE ENCOUNTER
Ingrid Galindo at [facility] said pt could not get off work for [employer]. I called pt to confirm and cancel, but no answer, Waiting to hear back from pt.

## 2019-04-19 ENCOUNTER — TELEPHONE (OUTPATIENT)
Dept: ORTHOPEDIC SURGERY | Age: 39
End: 2019-04-19

## 2019-04-19 NOTE — TELEPHONE ENCOUNTER
Called pt to confirm sx for Monday and that he got off work. No answer, I left another . Surgery center aware, and surgery is still scheduled.

## 2019-04-29 ENCOUNTER — TELEPHONE (OUTPATIENT)
Dept: ORTHOPEDIC SURGERY | Age: 39
End: 2019-04-29

## 2019-04-29 NOTE — TELEPHONE ENCOUNTER
Called pt to schedule surgery. He is going to call me after 5/8 to fgure out when he can get it scheduled for. He was unable to commit to a surgery date at this time.

## 2024-10-23 ENCOUNTER — HOSPITAL ENCOUNTER (EMERGENCY)
Age: 44
Discharge: HOME | End: 2024-10-23
Attending: EMERGENCY MEDICINE
Payer: COMMERCIAL

## 2024-10-23 VITALS
SYSTOLIC BLOOD PRESSURE: 150 MMHG | DIASTOLIC BLOOD PRESSURE: 94 MMHG | OXYGEN SATURATION: 99 % | HEART RATE: 78 BPM | RESPIRATION RATE: 18 BRPM | BODY MASS INDEX: 28.44 KG/M2 | WEIGHT: 210 LBS | TEMPERATURE: 98.3 F | HEIGHT: 72 IN

## 2024-10-23 DIAGNOSIS — I10 HYPERTENSION, UNSPECIFIED TYPE: Primary | ICD-10-CM

## 2024-10-23 PROCEDURE — 99283 EMERGENCY DEPT VISIT LOW MDM: CPT

## 2024-10-23 PROCEDURE — 6370000000 HC RX 637 (ALT 250 FOR IP): Performed by: EMERGENCY MEDICINE

## 2024-10-23 RX ORDER — HYDROCHLOROTHIAZIDE 25 MG/1
25 TABLET ORAL DAILY
Qty: 15 TABLET | Refills: 0 | Status: SHIPPED | OUTPATIENT
Start: 2024-10-23 | End: 2024-11-07

## 2024-10-23 RX ORDER — CLONIDINE HYDROCHLORIDE 0.2 MG/1
0.2 TABLET ORAL 2 TIMES DAILY
Qty: 30 TABLET | Refills: 0 | Status: SHIPPED | OUTPATIENT
Start: 2024-10-23 | End: 2024-11-07

## 2024-10-23 RX ORDER — METOPROLOL TARTRATE 50 MG
50 TABLET ORAL ONCE
Status: COMPLETED | OUTPATIENT
Start: 2024-10-23 | End: 2024-10-23

## 2024-10-23 RX ORDER — HYDROCHLOROTHIAZIDE 25 MG/1
25 TABLET ORAL ONCE
Status: COMPLETED | OUTPATIENT
Start: 2024-10-23 | End: 2024-10-23

## 2024-10-23 RX ORDER — METOPROLOL SUCCINATE 50 MG/1
50 TABLET, EXTENDED RELEASE ORAL 2 TIMES DAILY
Qty: 30 TABLET | Refills: 0 | Status: SHIPPED | OUTPATIENT
Start: 2024-10-23 | End: 2024-11-07

## 2024-10-23 RX ORDER — ACETAMINOPHEN 500 MG
1000 TABLET ORAL ONCE
Status: COMPLETED | OUTPATIENT
Start: 2024-10-23 | End: 2024-10-23

## 2024-10-23 RX ORDER — CLONIDINE HYDROCHLORIDE 0.1 MG/1
0.1 TABLET ORAL ONCE
Status: COMPLETED | OUTPATIENT
Start: 2024-10-23 | End: 2024-10-23

## 2024-10-23 RX ORDER — CLONIDINE HYDROCHLORIDE 0.2 MG/1
0.2 TABLET ORAL 2 TIMES DAILY
COMMUNITY
End: 2024-10-23

## 2024-10-23 RX ORDER — HYDROCHLOROTHIAZIDE 25 MG/1
25 TABLET ORAL DAILY
COMMUNITY
End: 2024-10-23

## 2024-10-23 RX ADMIN — ACETAMINOPHEN 1000 MG: 500 TABLET ORAL at 15:34

## 2024-10-23 RX ADMIN — HYDROCHLOROTHIAZIDE 25 MG: 25 TABLET ORAL at 15:35

## 2024-10-23 RX ADMIN — METOPROLOL TARTRATE 50 MG: 50 TABLET, FILM COATED ORAL at 15:34

## 2024-10-23 RX ADMIN — CLONIDINE HYDROCHLORIDE 0.1 MG: 0.1 TABLET ORAL at 15:34

## 2024-10-23 ASSESSMENT — PAIN - FUNCTIONAL ASSESSMENT
PAIN_FUNCTIONAL_ASSESSMENT: NONE - DENIES PAIN

## 2024-10-23 ASSESSMENT — PAIN SCALES - GENERAL: PAINLEVEL_OUTOF10: 5

## 2024-10-23 NOTE — ED PROVIDER NOTES
No meningismus. Trachea midline.  HEART: RRR. Radial pulses 2+.  LUNGS: Respirations unlabored. CTAB  EXTREMITIES: No acute deformities.  SKIN: Warm and dry.  NEUROLOGICAL: No gross facial drooping. Moves all 4 extremities spontaneously.  PSYCHIATRIC: Normal mood.          DIAGNOSTIC RESULTS   LABS:    Labs Reviewed - No data to display    When ordered only abnormal lab results are displayed. All other labs were within normal range or not returned as of this dictation.    EKG:     RADIOLOGY:   Non-plain film images such as CT, Ultrasound and MRI are read by the radiologist. Plain radiographic images are visualized and preliminarily interpreted by the ED Provider with the below findings:        Interpretation per the Radiologist below, if available at the time of this note:    No orders to display     No results found.    No results found.    PROCEDURES   Unless otherwise noted below, none     Procedures    CRITICAL CARE TIME       EMERGENCY DEPARTMENT COURSE and DIFFERENTIAL DIAGNOSIS/MDM:   Vitals:    Vitals:    10/23/24 1509 10/23/24 1523   BP: (!) 171/119 (!) 162/106   Pulse: 88 99   Resp: 18 18   Temp: 98.3 °F (36.8 °C)    TempSrc: Oral    SpO2: 96% 97%   Weight: 95.3 kg (210 lb)    Height: 1.829 m (6')        Patient was given the following medications:  Medications   cloNIDine (CATAPRES) tablet 0.1 mg (0.1 mg Oral Given 10/23/24 1534)   metoprolol tartrate (LOPRESSOR) tablet 50 mg (50 mg Oral Given 10/23/24 1534)   hydroCHLOROthiazide (HYDRODIURIL) tablet 25 mg (25 mg Oral Given 10/23/24 1535)   acetaminophen (TYLENOL) tablet 1,000 mg (1,000 mg Oral Given 10/23/24 1534)            Is this patient to be included in the SEP-1 Core Measure due to severe sepsis or septic shock?       PAST MEDICAL HISTORY      has a past medical history of Hypertension.     CC/HPI Summary, DDx, ED Course, and Reassessment: Archie Gonzalez is a 44 y.o. male who presents emergency department with high blood pressure.  Patient has a

## (undated) DEVICE — SOLUTION IV 1000ML 0.9% SOD CHL FOR IRRIG PLAS CONT

## (undated) DEVICE — Z CONVERTED USE 2273163 BANDAGE COMPR W3INXL4.5YD LTWT E EC SGL LAYERED CLP CLSR

## (undated) DEVICE — Device

## (undated) DEVICE — SOLUTION,SALINE,IRRGATION,500ML,STRL: Brand: MEDLINE

## (undated) DEVICE — COTTON UNDERCAST PADDING,REGULAR FINISH: Brand: WEBRIL

## (undated) DEVICE — STERILE LATEX POWDER-FREE SURGICAL GLOVESWITH NITRILE COATING: Brand: PROTEXIS

## (undated) DEVICE — COTTON UNDERCAST PADDING,CRIMPED FINISH: Brand: WEBRIL

## (undated) DEVICE — GLOVE SURG SZ 7 L12IN FNGR THK94MIL STD WHT ISOLEX LTX FREE

## (undated) DEVICE — SUTURE ETHLN SZ 4-0 L18IN NONABSORBABLE BLK L19MM PS-2 3/8 1667H

## (undated) DEVICE — SUTURE PROL SZ 6-0 L18IN NONABSORBABLE BLU P-3 L13MM 3/8 8695G